# Patient Record
Sex: MALE | Race: BLACK OR AFRICAN AMERICAN | URBAN - METROPOLITAN AREA
[De-identification: names, ages, dates, MRNs, and addresses within clinical notes are randomized per-mention and may not be internally consistent; named-entity substitution may affect disease eponyms.]

---

## 2017-07-05 ENCOUNTER — OP HISTORICAL/CONVERTED ENCOUNTER (OUTPATIENT)
Dept: OTHER | Age: 57
End: 2017-07-05

## 2023-02-27 ENCOUNTER — TRANSCRIBE ORDER (OUTPATIENT)
Dept: SCHEDULING | Age: 63
End: 2023-02-27

## 2023-02-27 DIAGNOSIS — M47.817 LUMBOSACRAL SPONDYLOSIS WITHOUT MYELOPATHY: Primary | ICD-10-CM

## 2023-03-20 ENCOUNTER — HOSPITAL ENCOUNTER (OUTPATIENT)
Dept: MRI IMAGING | Age: 63
Discharge: HOME | End: 2023-03-20
Attending: PHYSICAL MEDICINE & REHABILITATION
Payer: MEDICARE

## 2023-03-20 DIAGNOSIS — M47.817 LUMBOSACRAL SPONDYLOSIS WITHOUT MYELOPATHY: ICD-10-CM

## 2023-03-20 PROCEDURE — 72148 MRI LUMBAR SPINE W/O DYE: CPT

## 2023-05-18 ENCOUNTER — OFFICE VISIT (OUTPATIENT)
Age: 63
End: 2023-05-18

## 2023-05-18 VITALS — TEMPERATURE: 97.8 F | DIASTOLIC BLOOD PRESSURE: 87 MMHG | HEART RATE: 72 BPM | SYSTOLIC BLOOD PRESSURE: 137 MMHG

## 2023-05-18 DIAGNOSIS — M79.675 PAIN IN TOES OF BOTH FEET: ICD-10-CM

## 2023-05-18 DIAGNOSIS — L60.2 HYPERTROPHY OF NAIL: Primary | ICD-10-CM

## 2023-05-18 DIAGNOSIS — M79.674 PAIN IN TOES OF BOTH FEET: ICD-10-CM

## 2023-05-18 RX ORDER — LEVOTHYROXINE SODIUM 0.1 MG/1
100 TABLET ORAL DAILY
COMMUNITY

## 2023-05-18 RX ORDER — AMLODIPINE BESYLATE 5 MG/1
5 TABLET ORAL DAILY
COMMUNITY

## 2023-05-28 ASSESSMENT — ENCOUNTER SYMPTOMS
VOMITING: 0
SHORTNESS OF BREATH: 0
DIARRHEA: 0
ABDOMINAL PAIN: 0

## 2023-09-12 ENCOUNTER — HOSPITAL ENCOUNTER (OUTPATIENT)
Facility: HOSPITAL | Age: 63
Discharge: HOME | End: 2023-09-15
Payer: MEDICARE

## 2023-09-12 DIAGNOSIS — R06.02 SHORTNESS OF BREATH: ICD-10-CM

## 2023-09-12 PROCEDURE — 71046 X-RAY EXAM CHEST 2 VIEWS: CPT

## 2023-10-25 NOTE — TELEPHONE ENCOUNTER
LOV 05-18-23  No future OV scheduled    Pharmacy requesting refill on Ciclopirox 0.77% cream; 30 grams. LFD 08-19-23    Prescription pended for provider approval, if appropriate.

## 2023-12-13 NOTE — TELEPHONE ENCOUNTER
Lov:05/18/23  Nov:None    Medication requested: Ciclopirox Olamine     Please send to pharmacy if appropriate. Can you please refill this in place of ?

## 2024-02-08 ENCOUNTER — OFFICE VISIT (OUTPATIENT)
Age: 64
End: 2024-02-08
Payer: MEDICARE

## 2024-02-08 VITALS
BODY MASS INDEX: 27.72 KG/M2 | WEIGHT: 198 LBS | TEMPERATURE: 98 F | HEART RATE: 78 BPM | SYSTOLIC BLOOD PRESSURE: 125 MMHG | OXYGEN SATURATION: 97 % | DIASTOLIC BLOOD PRESSURE: 80 MMHG | HEIGHT: 71 IN

## 2024-02-08 DIAGNOSIS — E03.8 OTHER SPECIFIED HYPOTHYROIDISM: Primary | ICD-10-CM

## 2024-02-08 PROCEDURE — 99204 OFFICE O/P NEW MOD 45 MIN: CPT | Performed by: STUDENT IN AN ORGANIZED HEALTH CARE EDUCATION/TRAINING PROGRAM

## 2024-02-08 RX ORDER — HYDROXYZINE HYDROCHLORIDE 25 MG/1
25 TABLET, FILM COATED ORAL EVERY 6 HOURS PRN
COMMUNITY
Start: 2024-01-21

## 2024-02-08 RX ORDER — EPINEPHRINE 0.3 MG/.3ML
INJECTION SUBCUTANEOUS
COMMUNITY
Start: 2023-12-27

## 2024-02-08 RX ORDER — MONTELUKAST SODIUM 10 MG/1
10 TABLET ORAL DAILY
COMMUNITY
Start: 2024-01-21

## 2024-02-08 RX ORDER — LEVOTHYROXINE SODIUM 0.05 MG/1
TABLET ORAL
Qty: 180 TABLET | Refills: 1 | Status: SHIPPED | OUTPATIENT
Start: 2024-02-08

## 2024-02-08 RX ORDER — FEXOFENADINE HCL 180 MG/1
TABLET ORAL
COMMUNITY
Start: 2024-01-21

## 2024-02-08 RX ORDER — AZELASTINE HYDROCHLORIDE 137 UG/1
SPRAY, METERED NASAL
COMMUNITY
Start: 2024-02-07

## 2024-02-08 NOTE — PATIENT INSTRUCTIONS
We will the colourless/white color thyroid pill 50 mcg   Other brands are unithroid, tirosint- check with insurance if covered

## 2024-02-08 NOTE — PROGRESS NOTES
Chief Complaint   Patient presents with    New Patient     thyroid    /80 (Site: Right Upper Arm, Position: Sitting, Cuff Size: Medium Adult)   Pulse 78   Temp 98 °F (36.7 °C) (Oral)   Ht 1.791 m (5' 10.5\")   Wt 89.8 kg (198 lb)   SpO2 97%   BMI 28.01 kg/m²  1. \"Have you been to the ER, urgent care clinic since your last visit?  Hospitalized since your last visit?\" No    2. \"Have you seen or consulted any other health care providers outside of the [facility] since your last visit?\" No     3. For patients aged 45-75: Has the patient had a colonoscopy / FIT/ Cologuard? Yes - Care Gap present. Most recent result on file      If the patient is female:    4. For patients aged 40-74: Has the patient had a mammogram within the past 2 years? NA - based on age or sex      5. For patients aged 21-65: Has the patient had a pap smear? NA - based on age or sex  
encounter.  1.Hypothyroidism  - patient reports hives started after dose was changed from 50 mcg to higher dose few years ago.   -50 mcg is white coloured pill.  -patient did not have allergic symptoms with 50 mcg.  -he could be allergic to dye in the coloured levothyroxine.  Recommendations  Will attempt to get the same dosing using 50 mcg levothyroxine- take 2 tablets a day and take 4 tablets on Sunday  If this does not help could consider another brand like tirosint or unithroid- patient will check with insurance       Total time spent in chart review, patient encounter, counseling and note writing is equal to  45 minutes           I have discussed the diagnosis with the patient and the intended plan .  The patient has received an after-visit summary and questions were answered concerning future plans.  I have discussed medication side effects .    Thank you for allowing me to participate in the care of this patient.    Sherrie Marshall      There are no Patient Instructions on file for this visit.  No follow-up provider specified.          Patient /caregiver verbalized understanding .  Voice-recognition software was used to generate this report, which may result in some phonetic-based errors in the grammar and contents.  Even though attempts were made to correct all the mistakes, some may have been missed and remained in the body of the report.

## 2024-05-08 RX ORDER — LEVOTHYROXINE SODIUM 0.05 MG/1
TABLET ORAL
Qty: 180 TABLET | Refills: 1 | Status: SHIPPED | OUTPATIENT
Start: 2024-05-08

## 2024-07-29 RX ORDER — LEVOTHYROXINE SODIUM 0.05 MG/1
TABLET ORAL
Qty: 192 TABLET | Refills: 1 | OUTPATIENT
Start: 2024-07-29

## 2024-09-01 RX ORDER — LEVOTHYROXINE SODIUM 50 UG/1
TABLET ORAL
Qty: 192 TABLET | Refills: 1 | OUTPATIENT
Start: 2024-09-01

## 2024-11-24 RX ORDER — LEVOTHYROXINE SODIUM 50 UG/1
TABLET ORAL
Qty: 192 TABLET | Refills: 1 | OUTPATIENT
Start: 2024-11-24

## 2024-12-18 RX ORDER — LEVOTHYROXINE SODIUM 50 UG/1
TABLET ORAL
Qty: 192 TABLET | Refills: 1 | OUTPATIENT
Start: 2024-12-18

## 2025-02-25 RX ORDER — LEVOTHYROXINE SODIUM 50 UG/1
TABLET ORAL
Qty: 192 TABLET | Refills: 1 | OUTPATIENT
Start: 2025-02-25

## 2025-05-03 ENCOUNTER — HOSPITAL ENCOUNTER (EMERGENCY)
Facility: HOSPITAL | Age: 65
Discharge: HOME | End: 2025-05-03
Attending: STUDENT IN AN ORGANIZED HEALTH CARE EDUCATION/TRAINING PROGRAM
Payer: MEDICARE

## 2025-05-03 ENCOUNTER — APPOINTMENT (OUTPATIENT)
Facility: HOSPITAL | Age: 65
End: 2025-05-03
Payer: MEDICARE

## 2025-05-03 VITALS
OXYGEN SATURATION: 98 % | TEMPERATURE: 98 F | BODY MASS INDEX: 27.2 KG/M2 | DIASTOLIC BLOOD PRESSURE: 84 MMHG | SYSTOLIC BLOOD PRESSURE: 124 MMHG | HEART RATE: 85 BPM | RESPIRATION RATE: 16 BRPM | WEIGHT: 190 LBS | HEIGHT: 70 IN

## 2025-05-03 DIAGNOSIS — R11.14 BILIOUS VOMITING WITH NAUSEA: Primary | ICD-10-CM

## 2025-05-03 DIAGNOSIS — K20.90 ESOPHAGITIS: ICD-10-CM

## 2025-05-03 DIAGNOSIS — E87.29 ALCOHOLIC KETOACIDOSIS: ICD-10-CM

## 2025-05-03 DIAGNOSIS — R45.851 SUICIDAL IDEATION: ICD-10-CM

## 2025-05-03 LAB
ALBUMIN SERPL-MCNC: 4.3 G/DL (ref 3.5–5)
ALBUMIN/GLOB SERPL: 1.1 (ref 1.1–2.2)
ALP SERPL-CCNC: 84 U/L (ref 45–117)
ALT SERPL-CCNC: 44 U/L (ref 12–78)
ANION GAP SERPL CALC-SCNC: 12 MMOL/L (ref 2–12)
APPEARANCE UR: CLEAR
AST SERPL W P-5'-P-CCNC: 21 U/L (ref 15–37)
BACTERIA URNS QL MICRO: NEGATIVE /HPF
BASOPHILS # BLD: 0.01 K/UL (ref 0–0.1)
BASOPHILS NFR BLD: 0.1 % (ref 0–1)
BILIRUB SERPL-MCNC: 0.5 MG/DL (ref 0.2–1)
BILIRUB UR QL: NEGATIVE
BUN SERPL-MCNC: 16 MG/DL (ref 6–20)
BUN/CREAT SERPL: 12 (ref 12–20)
CA-I BLD-MCNC: 9.3 MG/DL (ref 8.5–10.1)
CHLORIDE SERPL-SCNC: 104 MMOL/L (ref 97–108)
CO2 SERPL-SCNC: 21 MMOL/L (ref 21–32)
COLOR UR: ABNORMAL
CREAT SERPL-MCNC: 1.31 MG/DL (ref 0.7–1.3)
DIFFERENTIAL METHOD BLD: NORMAL
EOSINOPHIL # BLD: 0.03 K/UL (ref 0–0.4)
EOSINOPHIL NFR BLD: 0.4 % (ref 0–7)
EPITH CASTS URNS QL MICRO: ABNORMAL /LPF
ERYTHROCYTE [DISTWIDTH] IN BLOOD BY AUTOMATED COUNT: 13.4 % (ref 11.5–14.5)
GLOBULIN SER CALC-MCNC: 3.8 G/DL (ref 2–4)
GLUCOSE SERPL-MCNC: 82 MG/DL (ref 65–100)
GLUCOSE UR STRIP.AUTO-MCNC: NEGATIVE MG/DL
HCT VFR BLD AUTO: 39.9 % (ref 36.6–50.3)
HGB BLD-MCNC: 13.8 G/DL (ref 12.1–17)
HGB UR QL STRIP: NEGATIVE
HYALINE CASTS URNS QL MICRO: >20 /LPF (ref 0–5)
IMM GRANULOCYTES # BLD AUTO: 0.02 K/UL (ref 0–0.04)
IMM GRANULOCYTES NFR BLD AUTO: 0.2 % (ref 0–0.5)
KETONES UR QL STRIP.AUTO: 15 MG/DL
LACTATE BLD-SCNC: 2.6 MMOL/L (ref 0.4–2)
LACTATE BLD-SCNC: 3.99 MMOL/L (ref 0.4–2)
LACTATE SERPL-SCNC: 3 MMOL/L (ref 0.4–2)
LEUKOCYTE ESTERASE UR QL STRIP.AUTO: ABNORMAL
LIPASE SERPL-CCNC: 26 U/L (ref 13–75)
LYMPHOCYTES # BLD: 2.34 K/UL (ref 0.8–3.5)
LYMPHOCYTES NFR BLD: 28.7 % (ref 12–49)
MCH RBC QN AUTO: 31.3 PG (ref 26–34)
MCHC RBC AUTO-ENTMCNC: 34.6 G/DL (ref 30–36.5)
MCV RBC AUTO: 90.5 FL (ref 80–99)
MONOCYTES # BLD: 0.45 K/UL (ref 0–1)
MONOCYTES NFR BLD: 5.5 % (ref 5–13)
MUCOUS THREADS URNS QL MICRO: ABNORMAL /LPF
NEUTS SEG # BLD: 5.31 K/UL (ref 1.8–8)
NEUTS SEG NFR BLD: 65.1 % (ref 32–75)
NITRITE UR QL STRIP.AUTO: NEGATIVE
NRBC # BLD: 0 K/UL (ref 0–0.01)
NRBC BLD-RTO: 0 PER 100 WBC
PERFORMED BY:: ABNORMAL
PERFORMED BY:: ABNORMAL
PH UR STRIP: 5
PLATELET # BLD AUTO: 222 K/UL (ref 150–400)
PMV BLD AUTO: 10.6 FL (ref 8.9–12.9)
POTASSIUM SERPL-SCNC: 3.4 MMOL/L (ref 3.5–5.1)
PROT SERPL-MCNC: 8.1 G/DL (ref 6.4–8.2)
PROT UR STRIP-MCNC: NEGATIVE MG/DL
RBC # BLD AUTO: 4.41 M/UL (ref 4.1–5.7)
RBC #/AREA URNS HPF: ABNORMAL /HPF (ref 0–5)
SODIUM SERPL-SCNC: 137 MMOL/L (ref 136–145)
SP GR UR REFRACTOMETRY: 1.02 (ref 1–1.03)
TROPONIN I SERPL HS-MCNC: 38 NG/L (ref 0–76)
URINE CULTURE IF INDICATED: ABNORMAL
UROBILINOGEN UR QL STRIP.AUTO: 0.1 EU/DL (ref 0.2–1)
WBC # BLD AUTO: 8.2 K/UL (ref 4.1–11.1)
WBC URNS QL MICRO: ABNORMAL /HPF (ref 0–4)

## 2025-05-03 PROCEDURE — 84484 ASSAY OF TROPONIN QUANT: CPT

## 2025-05-03 PROCEDURE — 6360000004 HC RX CONTRAST MEDICATION: Performed by: STUDENT IN AN ORGANIZED HEALTH CARE EDUCATION/TRAINING PROGRAM

## 2025-05-03 PROCEDURE — 2500000003 HC RX 250 WO HCPCS: Performed by: STUDENT IN AN ORGANIZED HEALTH CARE EDUCATION/TRAINING PROGRAM

## 2025-05-03 PROCEDURE — 85025 COMPLETE CBC W/AUTO DIFF WBC: CPT

## 2025-05-03 PROCEDURE — 36415 COLL VENOUS BLD VENIPUNCTURE: CPT

## 2025-05-03 PROCEDURE — 99285 EMERGENCY DEPT VISIT HI MDM: CPT

## 2025-05-03 PROCEDURE — 83690 ASSAY OF LIPASE: CPT

## 2025-05-03 PROCEDURE — 96374 THER/PROPH/DIAG INJ IV PUSH: CPT

## 2025-05-03 PROCEDURE — 6370000000 HC RX 637 (ALT 250 FOR IP): Performed by: STUDENT IN AN ORGANIZED HEALTH CARE EDUCATION/TRAINING PROGRAM

## 2025-05-03 PROCEDURE — 2580000003 HC RX 258: Performed by: EMERGENCY MEDICINE

## 2025-05-03 PROCEDURE — 6360000002 HC RX W HCPCS: Performed by: STUDENT IN AN ORGANIZED HEALTH CARE EDUCATION/TRAINING PROGRAM

## 2025-05-03 PROCEDURE — 93005 ELECTROCARDIOGRAM TRACING: CPT | Performed by: STUDENT IN AN ORGANIZED HEALTH CARE EDUCATION/TRAINING PROGRAM

## 2025-05-03 PROCEDURE — 2580000003 HC RX 258: Performed by: STUDENT IN AN ORGANIZED HEALTH CARE EDUCATION/TRAINING PROGRAM

## 2025-05-03 PROCEDURE — 74177 CT ABD & PELVIS W/CONTRAST: CPT

## 2025-05-03 PROCEDURE — 96361 HYDRATE IV INFUSION ADD-ON: CPT

## 2025-05-03 PROCEDURE — 6360000002 HC RX W HCPCS

## 2025-05-03 PROCEDURE — 96375 TX/PRO/DX INJ NEW DRUG ADDON: CPT

## 2025-05-03 PROCEDURE — 81001 URINALYSIS AUTO W/SCOPE: CPT

## 2025-05-03 PROCEDURE — 96376 TX/PRO/DX INJ SAME DRUG ADON: CPT

## 2025-05-03 PROCEDURE — 83605 ASSAY OF LACTIC ACID: CPT

## 2025-05-03 PROCEDURE — 80053 COMPREHEN METABOLIC PANEL: CPT

## 2025-05-03 PROCEDURE — 90791 PSYCH DIAGNOSTIC EVALUATION: CPT | Performed by: SOCIAL WORKER

## 2025-05-03 RX ORDER — KETOROLAC TROMETHAMINE 15 MG/ML
15 INJECTION, SOLUTION INTRAMUSCULAR; INTRAVENOUS
Status: COMPLETED | OUTPATIENT
Start: 2025-05-03 | End: 2025-05-03

## 2025-05-03 RX ORDER — DROPERIDOL 2.5 MG/ML
0.62 INJECTION, SOLUTION INTRAMUSCULAR; INTRAVENOUS ONCE
Status: COMPLETED | OUTPATIENT
Start: 2025-05-03 | End: 2025-05-03

## 2025-05-03 RX ORDER — IOPAMIDOL 755 MG/ML
100 INJECTION, SOLUTION INTRAVASCULAR
Status: COMPLETED | OUTPATIENT
Start: 2025-05-03 | End: 2025-05-03

## 2025-05-03 RX ORDER — ONDANSETRON 2 MG/ML
INJECTION INTRAMUSCULAR; INTRAVENOUS
Status: COMPLETED
Start: 2025-05-03 | End: 2025-05-03

## 2025-05-03 RX ORDER — ONDANSETRON 2 MG/ML
4 INJECTION INTRAMUSCULAR; INTRAVENOUS EVERY 6 HOURS PRN
Status: DISCONTINUED | OUTPATIENT
Start: 2025-05-03 | End: 2025-05-03

## 2025-05-03 RX ORDER — PANTOPRAZOLE SODIUM 40 MG/1
40 TABLET, DELAYED RELEASE ORAL
Qty: 60 TABLET | Refills: 0 | Status: SHIPPED | OUTPATIENT
Start: 2025-05-03

## 2025-05-03 RX ORDER — SODIUM CHLORIDE, SODIUM LACTATE, POTASSIUM CHLORIDE, AND CALCIUM CHLORIDE .6; .31; .03; .02 G/100ML; G/100ML; G/100ML; G/100ML
1000 INJECTION, SOLUTION INTRAVENOUS ONCE
Status: COMPLETED | OUTPATIENT
Start: 2025-05-03 | End: 2025-05-03

## 2025-05-03 RX ORDER — ONDANSETRON 2 MG/ML
4 INJECTION INTRAMUSCULAR; INTRAVENOUS ONCE
Status: COMPLETED | OUTPATIENT
Start: 2025-05-03 | End: 2025-05-03

## 2025-05-03 RX ORDER — 0.9 % SODIUM CHLORIDE 0.9 %
1000 INTRAVENOUS SOLUTION INTRAVENOUS ONCE
Status: COMPLETED | OUTPATIENT
Start: 2025-05-03 | End: 2025-05-03

## 2025-05-03 RX ORDER — 0.9 % SODIUM CHLORIDE 0.9 %
1000 INTRAVENOUS SOLUTION INTRAVENOUS ONCE
Status: DISCONTINUED | OUTPATIENT
Start: 2025-05-03 | End: 2025-05-03

## 2025-05-03 RX ADMIN — IOPAMIDOL 100 ML: 755 INJECTION, SOLUTION INTRAVENOUS at 06:24

## 2025-05-03 RX ADMIN — SODIUM CHLORIDE, POTASSIUM CHLORIDE, SODIUM LACTATE AND CALCIUM CHLORIDE 1000 ML: 600; 310; 30; 20 INJECTION, SOLUTION INTRAVENOUS at 07:39

## 2025-05-03 RX ADMIN — SODIUM CHLORIDE 1000 ML: 0.9 INJECTION, SOLUTION INTRAVENOUS at 03:39

## 2025-05-03 RX ADMIN — LIDOCAINE HYDROCHLORIDE 40 ML: 20 SOLUTION ORAL at 07:51

## 2025-05-03 RX ADMIN — ONDANSETRON 4 MG: 2 INJECTION, SOLUTION INTRAMUSCULAR; INTRAVENOUS at 04:51

## 2025-05-03 RX ADMIN — SODIUM CHLORIDE, PRESERVATIVE FREE 20 MG: 5 INJECTION INTRAVENOUS at 04:51

## 2025-05-03 RX ADMIN — ONDANSETRON 4 MG: 2 INJECTION INTRAMUSCULAR; INTRAVENOUS at 01:45

## 2025-05-03 RX ADMIN — KETOROLAC TROMETHAMINE 15 MG: 15 INJECTION, SOLUTION INTRAMUSCULAR; INTRAVENOUS at 05:29

## 2025-05-03 RX ADMIN — ONDANSETRON 4 MG: 2 INJECTION, SOLUTION INTRAMUSCULAR; INTRAVENOUS at 01:45

## 2025-05-03 RX ADMIN — DROPERIDOL 0.62 MG: 2.5 INJECTION, SOLUTION INTRAMUSCULAR; INTRAVENOUS at 02:40

## 2025-05-03 ASSESSMENT — PAIN SCALES - GENERAL
PAINLEVEL_OUTOF10: 9
PAINLEVEL_OUTOF10: 8

## 2025-05-03 ASSESSMENT — LIFESTYLE VARIABLES
HOW MANY STANDARD DRINKS CONTAINING ALCOHOL DO YOU HAVE ON A TYPICAL DAY: 1 OR 2
HOW OFTEN DO YOU HAVE A DRINK CONTAINING ALCOHOL: MONTHLY OR LESS

## 2025-05-03 ASSESSMENT — PAIN DESCRIPTION - LOCATION
LOCATION: ABDOMEN
LOCATION: ABDOMEN

## 2025-05-03 ASSESSMENT — PAIN - FUNCTIONAL ASSESSMENT: PAIN_FUNCTIONAL_ASSESSMENT: 0-10

## 2025-05-03 NOTE — ED PROVIDER NOTES
[facility] EMERGENCY DEPT  EMERGENCY DEPARTMENT HISTORY AND PHYSICAL EXAM      Date of evaluation: 5/3/2025  Patient Name: Mane Mendoza  Birthdate 1960  MRN: [id number]  ED Provider: Leslie Pacheco MD   Note Started: 1:33 AM EDT 5/3/25    HISTORY OF PRESENT ILLNESS     Chief Complaint   Patient presents with    Abdominal Pain    Nausea    Vomiting       History Provided By: Patient, only     HPI: Mane Mendoza is a 64 y.o. male with a past medical history reviewed below presents with nausea vomiting abdominal pain that started today after smoking crack and smoking weed.  Patient reports he vomited once prior to arrival.  Denies chest pain, short of breath, fever, chills, headache dizziness.    PAST MEDICAL HISTORY   Past Medical History:  Past Medical History:   Diagnosis Date    Hypertension     Thyroid disease        Past Surgical History:  No past surgical history on file.    Family History:  No family history on file.    Social History:  Social History     Tobacco Use    Smoking status: Some Days     Current packs/day: 0.25     Types: Cigarettes    Smokeless tobacco: Never   Vaping Use    Vaping status: Never Used   Substance Use Topics    Alcohol use: Yes    Drug use: Yes     Types: Marijuana (Weed)       Allergies:  No Known Allergies    PCP: Homer Barrientos MD    Current Meds:   No current facility-administered medications for this encounter.     Current Outpatient Medications   Medication Sig Dispense Refill    levothyroxine (SYNTHROID) 50 MCG tablet TAKE 2 TABLET DAILY AND ON SUNDAY TAKE 4 180 tablet 1    Azelastine HCl 137 MCG/SPRAY SOLN       EPINEPHrine (EPIPEN) 0.3 MG/0.3ML SOAJ injection 1 INJECTION INTRAMUSCULAR AS NEEDED      fexofenadine (ALLEGRA) 180 MG tablet TAKE 1 TABLET(S) BY MOUTH ONE TIME DAILY      hydrOXYzine HCl (ATARAX) 25 MG tablet Take 1 tablet by mouth every 6 hours as needed      montelukast (SINGULAIR) 10 MG tablet Take 1 tablet by mouth daily      ciclopirox (LOPROX) 0.77 %  none  Procedures    SEPSIS REASSESSMENT & CRITICAL CARE TIME   SEPSIS REASSESSMENT: {Sepsis reassessment smartlist:46464}    {critical care smartlist:22570}  CLINICAL IMPRESSIONS   No diagnosis found.   SDOH/DISPOSITION/PLAN   Social Determinants affecting Treatment Plan: {Social Determinants:31278}    DISPOSITION               {ED Dispositions:46788}     PATIENT REFERRED TO:  No follow-up provider specified.      DISCHARGE MEDICATIONS:     Medication List        ASK your doctor about these medications      amLODIPine 5 MG tablet  Commonly known as: NORVASC     azelastine 137 MCG/SPRAY nasal spray  Commonly known as: ASTEPRO     ciclopirox 0.77 % cream  Commonly known as: LOPROX  APPLY TOPICALLY TWICE DAILY TO AFFECTED TOENAILS     EPINEPHrine 0.3 MG/0.3ML Soaj injection  Commonly known as: EPIPEN     fexofenadine 180 MG tablet  Commonly known as: ALLEGRA     hydrOXYzine HCl 25 MG tablet  Commonly known as: ATARAX     levothyroxine 50 MCG tablet  Commonly known as: SYNTHROID  TAKE 2 TABLET DAILY AND ON SUNDAY TAKE 4     montelukast 10 MG tablet  Commonly known as: SINGULAIR                DISCONTINUED MEDICATIONS:  Current Discharge Medication List          I am the Primary Clinician of Record. Leslie Pacheco MD (electronically signed)    (Please note that parts of this dictation were completed with voice recognition software. Quite often unanticipated grammatical, syntax, homophones, and other interpretive errors are inadvertently transcribed by the computer software. Please disregards these errors. Please excuse any errors that have escaped final proofreading.)

## 2025-05-03 NOTE — ED NOTES
Writer at bedside and patient states \"What if I just stay here all day, I have to if I say I want to hurt myself\". Writer asked suicide screening questions to patient, and patient stated that he \"had been seen recently for suicidal thoughts, that at times he wishes he would go to sleep and not wake up\". MD made aware, 1:1 initiated.

## 2025-05-03 NOTE — DISCHARGE INSTRUCTIONS
Thank you for choosing our Emergency Department for your care.  It is our privilege to care for you in your time of need.  In the next several days, you may receive a survey via email or mailed to your home about your experience with our team.  We would greatly appreciate you taking a few minutes to complete the survey, as we use this information to learn what we have done well and what we could be doing better. Thank you for trusting us with your care!    Below you will find a list of your tests from today's visit.   Labs and Radiology Studies  Recent Results (from the past 12 hours)   CBC with Diff    Collection Time: 05/03/25  1:38 AM   Result Value Ref Range    WBC 8.2 4.1 - 11.1 K/uL    RBC 4.41 4.10 - 5.70 M/uL    Hemoglobin 13.8 12.1 - 17.0 g/dL    Hematocrit 39.9 36.6 - 50.3 %    MCV 90.5 80.0 - 99.0 FL    MCH 31.3 26.0 - 34.0 PG    MCHC 34.6 30.0 - 36.5 g/dL    RDW 13.4 11.5 - 14.5 %    Platelets 222 150 - 400 K/uL    MPV 10.6 8.9 - 12.9 FL    Nucleated RBCs 0.0 0.0  WBC    nRBC 0.00 0.00 - 0.01 K/uL    Neutrophils % 65.1 32.0 - 75.0 %    Lymphocytes % 28.7 12.0 - 49.0 %    Monocytes % 5.5 5.0 - 13.0 %    Eosinophils % 0.4 0.0 - 7.0 %    Basophils % 0.1 0.0 - 1.0 %    Immature Granulocytes % 0.2 0 - 0.5 %    Neutrophils Absolute 5.31 1.80 - 8.00 K/UL    Lymphocytes Absolute 2.34 0.80 - 3.50 K/UL    Monocytes Absolute 0.45 0.00 - 1.00 K/UL    Eosinophils Absolute 0.03 0.00 - 0.40 K/UL    Basophils Absolute 0.01 0.00 - 0.10 K/UL    Immature Granulocytes Absolute 0.02 0.00 - 0.04 K/UL    Differential Type AUTOMATED     CMP    Collection Time: 05/03/25  1:38 AM   Result Value Ref Range    Sodium 137 136 - 145 mmol/L    Potassium 3.4 (L) 3.5 - 5.1 mmol/L    Chloride 104 97 - 108 mmol/L    CO2 21 21 - 32 mmol/L    Anion Gap 12 2 - 12 mmol/L    Glucose 82 65 - 100 mg/dL    BUN 16 6 - 20 mg/dL    Creatinine 1.31 (H) 0.70 - 1.30 mg/dL    BUN/Creatinine Ratio 12 12 - 20      Est, Glom Filt Rate 60 (L) >60

## 2025-05-03 NOTE — VIRTUAL HEALTH
Mane Mendoza  [id number]  1960     Social Work Behavioral Health Crisis Assessment    05/03/25    Chief Complaint: SI    HPI: Patient is a 64 y.o. Black /  male who presents for suicidal ideation. Patient presented to the ED on 05/03/25 from home due to physical impacts of recent drinking and cocaine use.     Per chart review:   Teresa Carey RN:       Writer at bedside and patient states \"What if I just stay here all day, I have to if I say I want to hurt myself\". Writer asked suicide screening questions to patient, and patient stated that he \"had been seen recently for suicidal thoughts, that at times he wishes he would go to sleep and not wake up\". MD made aware, 1:1 initiated.     When asked about comments to nurse earlier that day pt reported \"I was just joking incase I didn't want to be with my ex wife.\" Pt was cooperative with assessment and reported he has made comments to his ex-wife while upset that \"I might as well hurt myself\" but denied having any plans of self harm.  Pt denied any thoughts of SI to this writer and denied any history of SI or self harming actions.   Pt was future oriented and stated his current stressor is conflict with his recent ex-wife due to still living together.  Pt is waiting for a new place to be ready to move in and  hopes for that to occur soon.   Pt was able to identify coping skills to assist with stressors and was active in developing a safety plan.        Collateral: Unable to obtain collateral    Past Psychiatric History:  Previous Diagnoses/symptoms: Denies  Previous suicide attempts/self-harm: Denies  Inpatient psychiatric hospitalizations: no  Current outpatient psychiatric provider: Denies  Current therapist: States not in therapy  Previous psychiatric medication trials: No prior medication trials  Current psychiatric medications: No current psychiatric medications  Family Psychiatric History: Denies    Sleep Hours: 8 hours    Sleep concerns:

## 2025-05-03 NOTE — ED TRIAGE NOTES
Patient arrived to the ED cc of abdominal pain after drink alcohol, smoking \"crack\" and \"weed\" approx. 2 hours ago. Patient denies any chest pain / SOB. No med PTA. Patient has episode of emesis while in triage.

## 2025-05-04 LAB
EKG ATRIAL RATE: 97 BPM
EKG DIAGNOSIS: NORMAL
EKG P-R INTERVAL: 214 MS
EKG Q-T INTERVAL: 360 MS
EKG QRS DURATION: 108 MS
EKG QTC CALCULATION (BAZETT): 457 MS
EKG R AXIS: -24 DEGREES
EKG T AXIS: 91 DEGREES
EKG VENTRICULAR RATE: 97 BPM